# Patient Record
(demographics unavailable — no encounter records)

---

## 2024-11-18 NOTE — HISTORY OF PRESENT ILLNESS
[FreeTextEntry1] : 72 y.o female with hx of jpouch has a long rectal cuff here today with her  for pre-op for j-pouch redo surgery She is ready to proceed with surgery.  Ostomy is functioning.  Denies fever, cough, chest pain, SOB, nausea or vomiting.

## 2024-11-18 NOTE — PHYSICAL EXAM
[No Rash or Lesion] : No rash or lesion [Alert] : alert [Oriented to Person] : oriented to person [Oriented to Place] : oriented to place [Oriented to Time] : oriented to time [Calm] : calm [de-identified] : Midline scar; Lx port scars, ileostomy in RLQ [de-identified] : WDWN female, NAD [de-identified] : [place]/AT [facility] [de-identified] : No C/C/E

## 2024-11-18 NOTE — ASSESSMENT
[FreeTextEntry1] : She is a well-known patient of mine redo pouch expectations possibilities were explained risk benefits morbidity were explained she consents for the procedure including the failure rates potential chance of permanent ileostomy and injury to the other organs.   was present at the time of the discussion.  Thanks spent over 20 minutes face-to-face and the consenting was obtained she consents.

## 2024-12-17 NOTE — PROCEDURE
[FreeTextEntry1] : Patient was seen in office for post op/ ostomy check visit. Patient accompanied by her spouse. Chart reviewed & events noted. Complete ostomy pouching system changed. Patient & spouse observed steps for ostomy change. Ileostomy measures 1 inch- pink & viable, + function for flatus & pasty brown stool. Peristomal skin and mucocutaneous junction intact. Granuloma's noted at 3, 12 and 6 o'clock. Skin creases noted at 3 and 9 o'clock. Peristomal skin dusted w/ stoma powder to absorb moisture & excess removed. Area then dabbed w/ no sting barrier film e.g.cavilon to seal & protect. Granuloma's cauterized in office. Demonstarted how to apply ostomy ring to perimeter of stoma. A bead of stoma paste was applied to skin creases @ 3 & 9 o'clock & on the back of skin barrier near opening to caulk & drainable pouch applied. Repouched w/ 1 3/4" Mary Ann convex skin barrier. Education continued on odor control, showering, frequency of skin barrier & pouch change, home care & supplies. Mid- line abdominal incision was also assessed. Small opening measures 1.0 x 0.7 x1.8 cm. Minimal drainage. Unable to visualize wound bed. Wound was cleansed with vashe wash, applied a small piece of Aqaucel. Covered with adhesive dressing. Patient's homecare RN to continue wound care, may pack wound with iodoform strip packing. Patient and spouse with many questions- all questions answered & emotional support provided.   Will recommend:  1. Monitor output  2. Empty pouch when 1/3-1/2 full  3. Change pouching system every 3-4 days & prn leakage  4. Reinforce ostomy teaching w/patient  5. Contact ostomy specialists if questions/issues arise.  6. Recommended supplies: Mary Ann "1 3/4" Ceraplus convex skin barrier (#37615), Dennis 1 3/4" drainable pouch (#20180); Accessory products: stoma paste #870995, Stoma rings #7642, stoma powder (#2516) & Cavillon no sting barrier film wipe (#6697), stoma belt #6278

## 2024-12-17 NOTE — ASSESSMENT
[FreeTextEntry1] : 72 y.o female with hx of jpouch and long rectal cuff for EUA/pouchoscopy Enema prep  Risks, Benefits, Plan and Alternatives discussed and they consent.  6/25/2024 Prepared for 2 or 3 stage jpouch redo procedure. Discussed that she may have a loop ileostomy for 3-9 months depending on the surgery, it will be an intra-operative decision epidural ureter stents  pre/post op period discussed risk, benefit, plan and alternatives discussed will go to PST  7/12/2024 Continue VNS and PT as tolerated Follow up in clinic on Monday with Morro come in to the office on Monday for wound care. She will see PCP, NP today -   12/17/2024 follow up with neftali follow up with morro follow up with pain/rheum to discuss tramadol for her shoulder pain follow up in 2 weeks with morro and for MRI/GGE on same day. IF continued weight loss will need to talk with Chela Curiel.

## 2024-12-17 NOTE — PHYSICAL EXAM
[No Rash or Lesion] : No rash or lesion [Alert] : alert [Oriented to Person] : oriented to person [Oriented to Place] : oriented to place [Oriented to Time] : oriented to time [Calm] : calm [de-identified] : +stoma [de-identified] : WDWN female, NAD [de-identified] : [place]/AT [facility] [de-identified] : No C/C/E noted

## 2024-12-17 NOTE — PHYSICAL EXAM
[TWNoteComboBox1] : LJ [TWNoteComboBox4] : Round [TWNoteComboBox6] : Pink [TWNoteComboBox5] : Intact [TWNoteComboBox2] : Ileostomy [TWNoteComboBox3] : 2-piece system

## 2024-12-17 NOTE — HISTORY OF PRESENT ILLNESS
[FreeTextEntry1] : 72 y.o female with jpouch and long rectal cuff -  She needs a full shoulder replacement - and has a lot of pain. It is her left shoulder. Her orthopedist agrees to colorectal surgery first.She is currently doing PT.  She has seen her pulmonologist and cardiologist. Feels ready for surgery now - will start with EUA/pouchoscopy No chest pain, cough, fever or shortness of breath. No nausea/vomiting  6/25/2024 -  Feeling well. She has gotten her clearance from pulmonary and cardiology. No chest pain, cough or fever. She is optimistic about surgery  07/12/2024 She is having some issues with her incision. She has been drinking fluids and feeling better, although her incision. She is improving now - she had some bad reflux. She was miserable secondary to this. She has not been sleeping well, because she has not slept well due to reflux. She feels more herself today -  No fevers or chills. Appetite is improving.  She is on Celebrex 200mg BID and Tylenol 1000mg q6h  12/17/2024 Seen today, she is doing better than last week. Still having a lot of reflux and joint pain in her shoulder. She is seeing Rheum and pain management for her pain. She has stopped her celebrex due to anticoagulant for her blood clot. She has some epigastric tenderness which prohibits her from eating at times. Will continue to monitor her weight and if she continues to lose, may need to talk about TPN.

## 2024-12-31 NOTE — PROCEDURE
[FreeTextEntry1] : Patient was seen in office for post op/ ostomy care visit. Patient accompanied by her spouse. Complete ostomy pouching system changed. Patient & spouse observed steps for ostomy change. Ileostomy measures 1 inch- pink & viable, + function for flatus & pasty brown stool. Peristomal skin and mucocutaneous junction intact. Granuloma's at 3, 12 and 6 o'clock has dissolved. Patient now with a granuloma at 9 o'clock. Skin creases noted at 3 and 9 o'clock.  Re-demonstrated how to apply an ostomy ring to perimeter of stoma. A bead of stoma paste was applied to skin creases @ 3 & 9 o'clock & on the back of skin barrier near opening to caulk & drainable pouch applied. Repouched w/ 1 3/4" Mary Ann convex skin barrier. Education continued on odor control, showering, frequency of skin barrier & pouch change, home care & supplies. Mid- line abdominal incision was also assessed. Minimal drainage noted. Wound was cleansed with vashe wash, applied a small piece of Aqaucel. Covered with adhesive dressing. Patient's homecare RN to continue wound care. Patient and spouse with many questions- all questions answered & emotional support provided.  Will recommend:  1. Monitor output  2. Empty pouch when 1/3-1/2 full  3. Change pouching system every 3-4 days & prn leakage  4. Reinforce ostomy teaching w/patient  5. Contact ostomy specialists if questions/issues arise.  6. Recommended supplies: Mary Ann "1 3/4" Ceraplus convex skin barrier (#26172), Vanderwagen 1 3/4" drainable pouch (#00823); Accessory products: stoma paste #527907, Stoma rings #1214, stoma powder (#8012) & Cavillon no sting barrier film wipe (#1359), stoma belt #4743

## 2025-01-16 NOTE — PROCEDURE
[FreeTextEntry1] : Patient was seen in office for post op/ ostomy care visit. Patient accompanied by her spouse. Complete ostomy pouching system changed. Patient & spouse observed steps for ostomy change. Ileostomy measures 1 inch- pink & viable, + function for flatus & pasty brown stool. Peristomal skin and mucocutaneous junction intact. Granuloma located at 8 o'clock which was cauterized in office. Skin creases noted at 3 and 9 o'clock. Ostomy ring applied to creases. A bead of stoma paste was also applied to skin creases @ 3 & 9 o'clock & on the back of skin barrier near opening to caulk & drainable pouch applied. Repouched w/ 1 3/4" Haskell convex skin barrier. Mid- line abdominal incision was also assessed. Minimal drainage noted. Wound was cleansed with vashe wash, applied a small piece of Aqaucel. Covered with adhesive dressing. Patient's homecare RN to continue wound care. Patient and spouse with many questions- all questions answered & emotional support provided.  Will recommend:  1. Monitor output  2. Empty pouch when 1/3-1/2 full  3. Change pouching system every 3-4 days & prn leakage  4. Reinforce ostomy teaching w/patient  5. Contact ostomy specialists if questions/issues arise.  6. Recommended supplies: Haskell "1 3/4" Ceraplus convex skin barrier (#47528), Haskell 1 3/4" drainable pouch (#30012); Accessory products: stoma paste #[id number], Stoma rings #2172, stoma powder (#9085) & Cavillon no sting barrier film wipe (#0799), stoma belt #9192

## 2025-01-20 NOTE — PHYSICAL EXAM
[No Rash or Lesion] : No rash or lesion [Alert] : alert [Oriented to Person] : oriented to person [Oriented to Place] : oriented to place [Oriented to Time] : oriented to time [Calm] : calm [de-identified] : +stoma [de-identified] : WDWEMILIA

## 2025-01-20 NOTE — ASSESSMENT
[FreeTextEntry1] : 73 y.o female with hx of jpouch and long rectal cuff for EUA/pouchoscopy Enema prep  Risks, Benefits, Plan and Alternatives discussed and they consent.  6/25/2024 Prepared for 2 or 3 stage jpouch redo procedure. Discussed that she may have a loop ileostomy for 3-9 months depending on the surgery, it will be an intra-operative decision epidural ureter stents pre/post op period discussed risk, benefit, plan and alternatives discussed  7/12/2024 Continue VNS and PT as tolerated Follow up in clinic on Monday with Morro come in to the office on Monday for wound care. She will see PCP, NP today -  12/17/2024 follow up with neftali follow up with morro follow up with pain/rheum to discuss tramadol for her shoulder pain follow up in 2 weeks with morro and for MRI/GGE on same day. IF continued weight loss will need to talk with Chela Curiel.  01/16/25 72 y/o F with hx of j-pouch redo who was seen today with her  for pre-op for EUA/pouchoscopy on 01/21/25 -met with RD today -MRI reviewed with pt. It has been reviewed by Dr. Carson  -Risks, benefits, and alternatives reviewed with patient. Patient consents to procedure.

## 2025-01-20 NOTE — ASSESSMENT
[FreeTextEntry1] : 73 y.o female with hx of jpouch and long rectal cuff for EUA/pouchoscopy Enema prep  Risks, Benefits, Plan and Alternatives discussed and they consent.  6/25/2024 Prepared for 2 or 3 stage jpouch redo procedure. Discussed that she may have a loop ileostomy for 3-9 months depending on the surgery, it will be an intra-operative decision epidural ureter stents pre/post op period discussed risk, benefit, plan and alternatives discussed  7/12/2024 Continue VNS and PT as tolerated Follow up in clinic on Monday with Morro come in to the office on Monday for wound care. She will see PCP, NP today -  12/17/2024 follow up with neftali follow up with morro follow up with pain/rheum to discuss tramadol for her shoulder pain follow up in 2 weeks with morro and for MRI/GGE on same day. IF continued weight loss will need to talk with Chela Curiel.  01/16/25 74 y/o F with hx of j-pouch redo who was seen today with her  for pre-op for EUA/pouchoscopy on 01/21/25 -met with RD today -MRI reviewed with pt. It has been reviewed by Dr. Carson  -Risks, benefits, and alternatives reviewed with patient. Patient consents to procedure.

## 2025-01-20 NOTE — REVIEW OF SYSTEMS
[Recent Weight Gain (___ Lbs)] : recent [unfilled] ~Ulb weight gain [Abdominal Pain] : abdominal pain [As Noted in HPI] : as noted in HPI [Negative] : Heme/Lymph [Fever] : no fever [Chills] : no chills [FreeTextEntry7] : see HPI  [FreeTextEntry9] : +shoulder pain

## 2025-01-20 NOTE — HISTORY OF PRESENT ILLNESS
[FreeTextEntry1] : The pt is a 72 y.o female with jpouch and long rectal cuff - She needs a full shoulder replacement - and has a lot of pain. It is her left shoulder. Her orthopedist agrees to colorectal surgery first.She is currently doing PT. She has seen her pulmonologist and cardiologist. Feels ready for surgery now - will start with EUA/pouchoscopy No chest pain, cough, fever or shortness of breath. No nausea/vomiting  6/25/2024 - Feeling well. She has gotten her clearance from pulmonary and cardiology. No chest pain, cough or fever. She is optimistic about surgery  07/12/2024 She is having some issues with her incision. She has been drinking fluids and feeling better, although her incision. She is improving now - she had some bad reflux. She was miserable secondary to this. She has not been sleeping well, because she has not slept well due to reflux. She feels more herself today - No fevers or chills. Appetite is improving. She is on Celebrex 200mg BID and Tylenol 1000mg q6h  12/17/2024 Seen today, she is doing better than last week. Still having a lot of reflux and joint pain in her shoulder. She is seeing Rheum and pain management for her pain. She has stopped her celebrex due to anticoagulant for her blood clot. She has some epigastric tenderness which prohibits her from eating at times. Will continue to monitor her weight and if she continues to lose, may need to talk about TPN.  01/16/2025 74 y/o F with hx of j-pouch redo who was seen today with her  for pre-op for EUA/pouchoscopy on 01/21/25 She has some intermittent abdominal pain that radiates to her back. She states that it "sometimes takes my breath away". This abdominal pain is not worsened by eating or movement. She does not recall it waking her up at night. The pain does cause her from eating at times.  She met with RD today. She has gained some weight - 2 lbs.  She has some mucus d/c per anus Ostomy is functioning.  Denies fever, cough, chest pain, vomiting, dysuria, hematuria, rectal pain when sitting, leg pain, or leg swelling.   MRI pelvis, 12/31/24 IMPRESSION: *  No evidence of leak, fistula or abscess. *  180 degree twist of the pouch as the pouch body approaches the IPAA.

## 2025-01-20 NOTE — PHYSICAL EXAM
[No Rash or Lesion] : No rash or lesion [Alert] : alert [Oriented to Person] : oriented to person [Oriented to Place] : oriented to place [Oriented to Time] : oriented to time [Calm] : calm [de-identified] : +stoma [de-identified] : WDWEMILIA

## 2025-01-20 NOTE — PHYSICAL EXAM
[No Rash or Lesion] : No rash or lesion [Alert] : alert [Oriented to Person] : oriented to person [Oriented to Place] : oriented to place [Oriented to Time] : oriented to time [Calm] : calm [de-identified] : +stoma [de-identified] : WDWEMILIA

## 2025-02-07 NOTE — PHYSICAL EXAM
[No Rash or Lesion] : No rash or lesion [Alert] : alert [Oriented to Person] : oriented to person [Oriented to Place] : oriented to place [Oriented to Time] : oriented to time [Calm] : calm [de-identified] : +stoma; midline wound c/d [de-identified] : WDWN female, NAD [de-identified] : [place]/AT [facility] [de-identified] : No C/C/E noted

## 2025-02-07 NOTE — HISTORY OF PRESENT ILLNESS
[FreeTextEntry1] : 72 y.o female with jpouch and long rectal cuff -  She needs a full shoulder replacement - and has a lot of pain. It is her left shoulder. Her orthopedist agrees to colorectal surgery first.She is currently doing PT.  She has seen her pulmonologist and cardiologist. Feels ready for surgery now - will start with EUA/pouchoscopy No chest pain, cough, fever or shortness of breath. No nausea/vomiting  6/25/2024 -  Feeling well. She has gotten her clearance from pulmonary and cardiology. No chest pain, cough or fever. She is optimistic about surgery  07/12/2024 She is having some issues with her incision. She has been drinking fluids and feeling better, although her incision. She is improving now - she had some bad reflux. She was miserable secondary to this. She has not been sleeping well, because she has not slept well due to reflux. She feels more herself today -  No fevers or chills. Appetite is improving.  She is on Celebrex 200mg BID and Tylenol 1000mg q6h  12/17/2024 Seen today, she is doing better than last week. Still having a lot of reflux and joint pain in her shoulder. She is seeing Rheum and pain management for her pain. She has stopped her celebrex due to anticoagulant for her blood clot. She has some epigastric tenderness which prohibits her from eating at times. Will continue to monitor her weight and if she continues to lose, may need to talk about TPN.  2/7/2025 72 yo female with hx of jpouch redo procedure. She has not had small bowel obstructions since the redo surgery. No chest pain, cough or fever. She is doing well Had cataract surgery and is feeling well - and seeing better. Will plan for ileostomy closure next month

## 2025-02-07 NOTE — ASSESSMENT
[FreeTextEntry1] : 72 y.o female with hx of jpouch and long rectal cuff for EUA/pouchoscopy Enema prep  Risks, Benefits, Plan and Alternatives discussed and they consent.  6/25/2024 Prepared for 2 or 3 stage jpouch redo procedure. Discussed that she may have a loop ileostomy for 3-9 months depending on the surgery, it will be an intra-operative decision epidural ureter stents  pre/post op period discussed risk, benefit, plan and alternatives discussed will go to PST  7/12/2024 Continue VNS and PT as tolerated Follow up in clinic on Monday with Morro come in to the office on Monday for wound care. She will see PCP, NP today -   12/17/2024 follow up with neftali follow up with morro follow up with pain/rheum to discuss tramadol for her shoulder pain follow up in 2 weeks with morro and for MRI/GGE on same day. IF continued weight loss will need to talk with Chela Curiel.  02/07/2025 Seen today with Dr. Osorio wound examined - and silver nitrate applied by Dr. Osorio. Will follow up with umer.  Risks, Benefits, Plan and Alternatives discussed and they consent.

## 2025-03-04 NOTE — REASON FOR VISIT
[Home] : at home, [unfilled] , at the time of the visit. [Medical Office: ([facility])___] : at the medical office located in  [This encounter was initiated by telehealth (audio with video) and converted to telephone (audio only)] : This encounter was initiated by telehealth (audio with video) and converted to telephone (audio only) [Technical] : patient unable to effectively utilize tele-video due to technical issues. [Verbal consent obtained from patient] : the patient, [unfilled] [Follow-Up: _____] : a [unfilled] follow-up visit

## 2025-03-04 NOTE — REASON FOR VISIT
[Home] : at home, [unfilled] , at the time of the visit. [Medical Office: ([facility])___] : at the medical office located in  [This encounter was initiated by telehealth (audio with video) and converted to telephone (audio only)] : This encounter was initiated by telehealth (audio with video) and converted to telephone (audio only) [Technical] : patient unable to effectively utilize tele-video due to technical issues. [Verbal consent obtained from patient] : the patient, [unfilled] [Follow-Up: _____] : a [unfilled] follow-up visit

## 2025-03-11 NOTE — HISTORY OF PRESENT ILLNESS
[FreeTextEntry1] : 72 y.o female with jpouch and long rectal cuff -  She needs a full shoulder replacement - and has a lot of pain. It is her left shoulder. Her orthopedist agrees to colorectal surgery first.She is currently doing PT.  She has seen her pulmonologist and cardiologist. Feels ready for surgery now - will start with EUA/pouchoscopy No chest pain, cough, fever or shortness of breath. No nausea/vomiting  6/25/2024 -  Feeling well. She has gotten her clearance from pulmonary and cardiology. No chest pain, cough or fever. She is optimistic about surgery  07/12/2024 She is having some issues with her incision. She has been drinking fluids and feeling better, although her incision. She is improving now - she had some bad reflux. She was miserable secondary to this. She has not been sleeping well, because she has not slept well due to reflux. She feels more herself today -  No fevers or chills. Appetite is improving.  She is on Celebrex 200mg BID and Tylenol 1000mg q6h  12/17/2024 Seen today, she is doing better than last week. Still having a lot of reflux and joint pain in her shoulder. She is seeing Rheum and pain management for her pain. She has stopped her celebrex due to anticoagulant for her blood clot. She has some epigastric tenderness which prohibits her from eating at times. Will continue to monitor her weight and if she continues to lose, may need to talk about TPN.  2/7/2025 74 yo female with hx of jpouch redo procedure. She has not had small bowel obstructions since the redo surgery. No chest pain, cough or fever. She is doing well Had cataract surgery and is feeling well - and seeing better. Will plan for ileostomy closure next month  3/4/2025 74 yo female - she is scheduled for ileostomy closure next week. She has seen her hematologist  hx of L arm DVT - will hold eliquis for 2 days prior to surgery. She used to be on Celebrex - but has diffuse joint pains without it (since she is on)

## 2025-03-11 NOTE — ASSESSMENT
[FreeTextEntry1] : 72 y.o female with hx of jpouch and long rectal cuff for EUA/pouchoscopy Enema prep  Risks, Benefits, Plan and Alternatives discussed and they consent.  6/25/2024 Prepared for 2 or 3 stage jpouch redo procedure. Discussed that she may have a loop ileostomy for 3-9 months depending on the surgery, it will be an intra-operative decision epidural ureter stents  pre/post op period discussed risk, benefit, plan and alternatives discussed will go to PST  7/12/2024 Continue VNS and PT as tolerated Follow up in clinic on Monday with Morro come in to the office on Monday for wound care. She will see PCP, NP today -   12/17/2024 follow up with neftali follow up with morro follow up with pain/rheum to discuss tramadol for her shoulder pain follow up in 2 weeks with morro and for MRI/GGE on same day. IF continued weight loss will need to talk with Chela Curiel.  02/07/2025 Seen today with Dr. Osorio wound examined - and silver nitrate applied by Dr. Osorio. Will follow up with heme.  Risks, Benefits, Plan and Alternatives discussed and they consent.  3/4/2025 74 yo female for ileostomy closure PST will need US on UE for resolution of prior DVT discussed life with a jpouch  Risks, Benefits, Plan and Alternatives discussed and they consent.

## 2025-03-11 NOTE — HISTORY OF PRESENT ILLNESS
[FreeTextEntry1] : 72 y.o female with jpouch and long rectal cuff -  She needs a full shoulder replacement - and has a lot of pain. It is her left shoulder. Her orthopedist agrees to colorectal surgery first.She is currently doing PT.  She has seen her pulmonologist and cardiologist. Feels ready for surgery now - will start with EUA/pouchoscopy No chest pain, cough, fever or shortness of breath. No nausea/vomiting  6/25/2024 -  Feeling well. She has gotten her clearance from pulmonary and cardiology. No chest pain, cough or fever. She is optimistic about surgery  07/12/2024 She is having some issues with her incision. She has been drinking fluids and feeling better, although her incision. She is improving now - she had some bad reflux. She was miserable secondary to this. She has not been sleeping well, because she has not slept well due to reflux. She feels more herself today -  No fevers or chills. Appetite is improving.  She is on Celebrex 200mg BID and Tylenol 1000mg q6h  12/17/2024 Seen today, she is doing better than last week. Still having a lot of reflux and joint pain in her shoulder. She is seeing Rheum and pain management for her pain. She has stopped her celebrex due to anticoagulant for her blood clot. She has some epigastric tenderness which prohibits her from eating at times. Will continue to monitor her weight and if she continues to lose, may need to talk about TPN.  2/7/2025 72 yo female with hx of jpouch redo procedure. She has not had small bowel obstructions since the redo surgery. No chest pain, cough or fever. She is doing well Had cataract surgery and is feeling well - and seeing better. Will plan for ileostomy closure next month  3/4/2025 72 yo female - she is scheduled for ileostomy closure next week. She has seen her hematologist  hx of L arm DVT - will hold eliquis for 2 days prior to surgery. She used to be on Celebrex - but has diffuse joint pains without it (since she is on)

## 2025-03-11 NOTE — PHYSICAL EXAM
[No Rash or Lesion] : No rash or lesion [Alert] : alert [Oriented to Person] : oriented to person [Oriented to Place] : oriented to place [Oriented to Time] : oriented to time [Calm] : calm [de-identified] : +stoma; midline wound c/d [de-identified] : WDWN female, NAD [de-identified] : NC/AT [facility] [de-identified] : No C/C/E noted

## 2025-05-29 NOTE — REASON FOR VISIT
[Home] : at home, [unfilled] , at the time of the visit. [Medical Office: ([facility])___] : at the medical office located in  [Telehealth (audio & video)] : This visit was provided via telehealth using real-time 2-way audio visual technology. [Verbal consent obtained from patient] : the patient, [unfilled]

## 2025-05-29 NOTE — REASON FOR VISIT
[Home] : at home, [unfilled] , at the time of the visit. [Medical Office: ([facility])___] : at the medical office located in  [Telehealth (audio & video)] : This visit was provided via telehealth using real-time 2-way audio visual technology. [Verbal consent obtained from patient] : the patient, [unfilled]

## 2025-06-02 NOTE — HISTORY OF PRESENT ILLNESS
[FreeTextEntry1] : It was a pleasure to see Keesha in the office today when she came to see me for a follow-up discussion regarding her parenteral nutrition.  She is also in the postoperative phase following a laparotomy and adhesiolysis performed by Dr. Carson.  She has been on TPN since her discharge because of difficulty tolerating oral intake.  However I am pleased to say that she now believes that she is not going to need the TPN for much longer that she is eating more more solid food.  She has gained some weight and has no abdominal pain eating what is essentially a low residue diet.  She is very anxious indeed to stop her TPN.  She has been receiving the TPN for 12 hours a day 7 days a week.  In a detailed 14-point system review conducted today I was unable to identify any additional symptoms or complaints of note other than those listed above in the history.

## 2025-06-02 NOTE — PHYSICAL EXAM
[Abdomen Tenderness] : ~T No ~M abdominal tenderness [Enlarged] : not enlarged [Exam Deferred] : exam was deferred [de-identified] : All midline wounds well-healed [de-identified] : Looking well and in excellent spirits [de-identified] : NAD [de-identified] : NAD [de-identified] : NAD [de-identified] : NAD [de-identified] : NAD [de-identified] : NAD [de-identified] : NAD [de-identified] : NAD [de-identified] : NAD

## 2025-06-02 NOTE — HISTORY OF PRESENT ILLNESS
[FreeTextEntry1] :  73-year-old woman, who presented with concern for a small bowel obstruction, while undergoing an exploratory laparotomy, the colorectal surgery team noted significant adhesions onto the right distal ureter. Inspection of the ureter noted a thin wall as a result of the adhesions. Right ureteral stent was placed as a precaution to protect the ureter.   No c/o minimal LUTS from stent  cystoscopy with right ureteral stent removed today pt tolerated well Bactrim DS bid x 1 day for prophylaxis

## 2025-06-02 NOTE — ASSESSMENT
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  At this point I think it is appropriate to start weaning her TPN and therefore I am going to reduce her to alternate days at the end of this week when her next prescription changes over.  If she is able to continue eating without difficulties and maintaining her weight over that timeframe I think we can stop the TPN and remove her PICC line.  I have described these proposals to her today and she is very happy with this plan.  During the consultation today I had the opportunity to review a range of documents including, but not limited to, cross-sectional imaging, pathology results and endoscopy details as well as additional medical records both electronic and paper form.  I reviewed these in advance of the consultation as well as during the discussion with the patient.

## 2025-06-06 NOTE — ASSESSMENT
[FreeTextEntry1] : 72 y.o female with hx of jpouch and long rectal cuff for EUA/pouchoscopy Enema prep  Risks, Benefits, Plan and Alternatives discussed and they consent.  6/25/2024 Prepared for 2 or 3 stage jpouch redo procedure. Discussed that she may have a loop ileostomy for 3-9 months depending on the surgery, it will be an intra-operative decision epidural ureter stents  pre/post op period discussed risk, benefit, plan and alternatives discussed will go to PST  7/12/2024 Continue VNS and PT as tolerated Follow up in clinic on Monday with Morro come in to the office on Monday for wound care. She will see PCP, NP today -   12/17/2024 follow up with neftali follow up with morro follow up with pain/rheum to discuss tramadol for her shoulder pain follow up in 2 weeks with morro and for MRI/GGE on same day. IF continued weight loss will need to talk with Chela Curiel.  02/07/2025 Seen today with Dr. Osorio wound examined - and silver nitrate applied by Dr. Osorio. Will follow up with heme.  Risks, Benefits, Plan and Alternatives discussed and they consent.  3/4/2025 72 yo female for ileostomy closure PST will need US on UE for resolution of prior DVT discussed life with a jpouch  Risks, Benefits, Plan and Alternatives discussed and they consent.  5/29/2025 Follow up with Dr. York Follow up with Dr. Carson as needed continue to slowly liberalize diet and activity 1 year or PRN for pouchoscopy

## 2025-06-06 NOTE — HISTORY OF PRESENT ILLNESS
[FreeTextEntry1] : 72 y.o female with jpouch and long rectal cuff -  She needs a full shoulder replacement - and has a lot of pain. It is her left shoulder. Her orthopedist agrees to colorectal surgery first.She is currently doing PT.  She has seen her pulmonologist and cardiologist. Feels ready for surgery now - will start with EUA/pouchoscopy No chest pain, cough, fever or shortness of breath. No nausea/vomiting  6/25/2024 -  Feeling well. She has gotten her clearance from pulmonary and cardiology. No chest pain, cough or fever. She is optimistic about surgery  07/12/2024 She is having some issues with her incision. She has been drinking fluids and feeling better, although her incision. She is improving now - she had some bad reflux. She was miserable secondary to this. She has not been sleeping well, because she has not slept well due to reflux. She feels more herself today -  No fevers or chills. Appetite is improving.  She is on Celebrex 200mg BID and Tylenol 1000mg q6h  12/17/2024 Seen today, she is doing better than last week. Still having a lot of reflux and joint pain in her shoulder. She is seeing Rheum and pain management for her pain. She has stopped her celebrex due to anticoagulant for her blood clot. She has some epigastric tenderness which prohibits her from eating at times. Will continue to monitor her weight and if she continues to lose, may need to talk about TPN.  2/7/2025 72 yo female with hx of jpouch redo procedure. She has not had small bowel obstructions since the redo surgery. No chest pain, cough or fever. She is doing well Had cataract surgery and is feeling well - and seeing better. Will plan for ileostomy closure next month  3/4/2025 72 yo female - she is scheduled for ileostomy closure next week. She has seen her hematologist  hx of L arm DVT - will hold eliquis for 2 days prior to surgery. She used to be on Celebrex - but has diffuse joint pains without it (since she is on)  5/29/2025 She is eating, and on TPN. She feels better than she did before. She is looking forward to having the ureter stent removed. She is overall doing ok. She is eating chicken and chewing. No vomiting. No fever. Energy is improving. She is not sleeping well at night with the TPN. She is currently 100lbs. She is moving her bowel 10-12x/day. She does have control over her bowels. She does wear a pad. Much less accidents.

## 2025-06-06 NOTE — ASSESSMENT
[FreeTextEntry1] : 72 y.o female with hx of jpouch and long rectal cuff for EUA/pouchoscopy Enema prep  Risks, Benefits, Plan and Alternatives discussed and they consent.  6/25/2024 Prepared for 2 or 3 stage jpouch redo procedure. Discussed that she may have a loop ileostomy for 3-9 months depending on the surgery, it will be an intra-operative decision epidural ureter stents  pre/post op period discussed risk, benefit, plan and alternatives discussed will go to PST  7/12/2024 Continue VNS and PT as tolerated Follow up in clinic on Monday with Morro come in to the office on Monday for wound care. She will see PCP, NP today -   12/17/2024 follow up with neftali follow up with morro follow up with pain/rheum to discuss tramadol for her shoulder pain follow up in 2 weeks with morro and for MRI/GGE on same day. IF continued weight loss will need to talk with Chela Curiel.  02/07/2025 Seen today with Dr. Osorio wound examined - and silver nitrate applied by Dr. Osorio. Will follow up with heme.  Risks, Benefits, Plan and Alternatives discussed and they consent.  3/4/2025 74 yo female for ileostomy closure PST will need US on UE for resolution of prior DVT discussed life with a jpouch  Risks, Benefits, Plan and Alternatives discussed and they consent.  5/29/2025 Follow up with Dr. York Follow up with Dr. Carson as needed continue to slowly liberalize diet and activity 1 year or PRN for pouchoscopy

## 2025-07-02 NOTE — HISTORY OF PRESENT ILLNESS
[FreeTextEntry1] : 72 y/o Female with PMHx of asthma, Esteban-Danlos syndrome, osteopenia, osteoarthritis, fibromyalgia, GERD, hypertension, cardiac aneurysm, thyroid nodule (no interventions needed follows with endocrinologist with biopsy), DVT (LUE on Eliquis), VERONICA (mild), SBOs; complicated surgical history with multiple repair of adhesions, laparotomy with loop ileostomy on 06/26/2024, completion of proctectomy, J-pouch augmentation with DLI and lysis of adhesions on 11/19/2024, readmitted 12/6/24 for LUE blood clot, s/p ileostomy reversal on 3/11, recent admission for continued abdominal pain and concerning for stricture, s/p EUA/pouchosocpy (04/02), without evidence of strictures of obstruction. Pt presented with continued RLQ abdominal pain, constipation, obstipation. CT showing focal stricture versus kinking between 2 right lower quadrant small bowel anastomoses. Now s/p R brach PICC placement on 4/24 and initiation of TPN. Pt s/p exploratory laparotomy, extensive lysis of adhesions, ileoanal pouchoscopy on 5/06 who is here for F/U

## 2025-07-02 NOTE — HISTORY OF PRESENT ILLNESS
[FreeTextEntry1] : 74 y/o Female with PMHx of asthma, Esteban-Danlos syndrome, osteopenia, osteoarthritis, fibromyalgia, GERD, hypertension, cardiac aneurysm, thyroid nodule (no interventions needed follows with endocrinologist with biopsy), DVT (LUE on Eliquis), VERONICA (mild), SBOs; complicated surgical history with multiple repair of adhesions, laparotomy with loop ileostomy on 06/26/2024, completion of proctectomy, J-pouch augmentation with DLI and lysis of adhesions on 11/19/2024, readmitted 12/6/24 for LUE blood clot, s/p ileostomy reversal on 3/11, recent admission for continued abdominal pain and concerning for stricture, s/p EUA/pouchosocpy (04/02), without evidence of strictures of obstruction. Pt presented with continued RLQ abdominal pain, constipation, obstipation. CT showing focal stricture versus kinking between 2 right lower quadrant small bowel anastomoses. Now s/p R brach PICC placement on 4/24 and initiation of TPN. Pt s/p exploratory laparotomy, extensive lysis of adhesions, ileoanal pouchoscopy on 5/06 who is here for F/U

## 2025-07-02 NOTE — PHYSICAL EXAM
[No Rash or Lesion] : No rash or lesion [Alert] : alert [Oriented to Person] : oriented to person [Oriented to Place] : oriented to place [Oriented to Time] : oriented to time [Calm] : calm [de-identified] : WDWN female, NAD [de-identified] : NC/AT [facility] [de-identified] : No C/C/E

## 2025-07-02 NOTE — ASSESSMENT
[FreeTextEntry1] : I did the exam under anesthesia last week and there was nothing impressive. At this stage, I think this can be a more pelvic floor issue or a conservative management.  If she feels the same way how she felt last week we may start with the manometry and pelvic floor and this may go all the way up to MRI and Gastrografin enema. However, I could not find anything mechanical considering the exam under anesthesia flexible pouchoscopy where the lumen was wide open.  She understands and she is the best that she has looked in my opinion as she feels the same way I will cannot take because and we will see how she progresses and move accordingly.  Spent 20 minutes face-to-face in the presence of her .

## 2025-07-02 NOTE — HISTORY OF PRESENT ILLNESS
[FreeTextEntry1] : see notes from last visit 73y/o woman here for follow up she had ultrasound done she is here to review images  Ultrasound June 2025: no hydronephrosis bilaterally, no stones, bladder unremarkable, bilateral jets seen

## 2025-07-02 NOTE — PHYSICAL EXAM
[No Rash or Lesion] : No rash or lesion [Alert] : alert [Oriented to Person] : oriented to person [Oriented to Place] : oriented to place [Oriented to Time] : oriented to time [Calm] : calm [de-identified] : WDWN female, NAD [de-identified] : NC/AT [facility] [de-identified] : No C/C/E